# Patient Record
Sex: FEMALE | Race: WHITE | NOT HISPANIC OR LATINO
[De-identification: names, ages, dates, MRNs, and addresses within clinical notes are randomized per-mention and may not be internally consistent; named-entity substitution may affect disease eponyms.]

---

## 2017-03-18 ENCOUNTER — TRANSCRIPTION ENCOUNTER (OUTPATIENT)
Age: 27
End: 2017-03-18

## 2018-07-02 ENCOUNTER — RESULT REVIEW (OUTPATIENT)
Age: 28
End: 2018-07-02

## 2019-10-02 ENCOUNTER — RESULT REVIEW (OUTPATIENT)
Age: 29
End: 2019-10-02

## 2019-12-09 ENCOUNTER — TRANSCRIPTION ENCOUNTER (OUTPATIENT)
Age: 29
End: 2019-12-09

## 2020-04-27 ENCOUNTER — APPOINTMENT (OUTPATIENT)
Dept: DISASTER EMERGENCY | Facility: CLINIC | Age: 30
End: 2020-04-27
Payer: MEDICAID

## 2020-04-27 VITALS
OXYGEN SATURATION: 98 % | DIASTOLIC BLOOD PRESSURE: 68 MMHG | TEMPERATURE: 98.4 F | SYSTOLIC BLOOD PRESSURE: 114 MMHG | HEART RATE: 81 BPM | RESPIRATION RATE: 14 BRPM

## 2020-04-27 DIAGNOSIS — R68.89 OTHER GENERAL SYMPTOMS AND SIGNS: ICD-10-CM

## 2020-04-27 DIAGNOSIS — Z20.828 CONTACT WITH AND (SUSPECTED) EXPOSURE TO OTHER VIRAL COMMUNICABLE DISEASES: ICD-10-CM

## 2020-04-27 LAB — SARS-COV-2 N GENE NPH QL NAA+PROBE: NOT DETECTED

## 2020-04-27 PROCEDURE — 99213 OFFICE O/P EST LOW 20 MIN: CPT

## 2020-04-27 NOTE — HISTORY OF PRESENT ILLNESS
[Patient presents to the office today for COVID-19 evaluation and testing.] : Patient presents to the office today for COVID-19 evaluation and testing. [Patient has been pre-screened by RN at call center for appointment today with our facility.] : Patient has been pre-screened by RN at call center for appointment today with our facility. 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 Patient told to seek emergent care if symptoms occur. [TextBox_[id number]] : asthma

## 2020-05-15 ENCOUNTER — TRANSCRIPTION ENCOUNTER (OUTPATIENT)
Age: 30
End: 2020-05-15

## 2020-10-16 ENCOUNTER — APPOINTMENT (OUTPATIENT)
Dept: OTOLARYNGOLOGY | Facility: CLINIC | Age: 30
End: 2020-10-16
Payer: MEDICAID

## 2020-10-16 VITALS
DIASTOLIC BLOOD PRESSURE: 78 MMHG | HEIGHT: 66 IN | SYSTOLIC BLOOD PRESSURE: 119 MMHG | HEART RATE: 89 BPM | BODY MASS INDEX: 32.95 KG/M2 | WEIGHT: 205 LBS | TEMPERATURE: 98.5 F

## 2020-10-16 PROCEDURE — 99204 OFFICE O/P NEW MOD 45 MIN: CPT | Mod: 25

## 2020-10-16 PROCEDURE — 31231 NASAL ENDOSCOPY DX: CPT

## 2020-10-16 NOTE — PHYSICAL EXAM
[Nasal Endoscopy Performed] : nasal endoscopy was performed, see procedure section for findings [] : septum deviated bilaterally [de-identified] : huge b/l [Midline] : trachea located in midline position [Normal] : orientation to person, place, and time: normal [de-identified] : mildly hyponasal

## 2020-10-16 NOTE — HISTORY OF PRESENT ILLNESS
[de-identified] : saw OSH ENT, given nasal spray for NSD, chronci nasal congestion\par +PND, HA, was using fluticasone, mild effect\par occasional epistaxis, many abx for ARS over the years, >1-2x per year for many years\par no recent CT sinuses\par some decreased smell\par occ otalgia, not often, no change in hearing\par no allergy testing, possible environmental all\par

## 2020-10-30 ENCOUNTER — TRANSCRIPTION ENCOUNTER (OUTPATIENT)
Age: 30
End: 2020-10-30

## 2020-11-30 ENCOUNTER — APPOINTMENT (OUTPATIENT)
Dept: CT IMAGING | Facility: CLINIC | Age: 30
End: 2020-11-30
Payer: MEDICAID

## 2020-11-30 ENCOUNTER — OUTPATIENT (OUTPATIENT)
Dept: OUTPATIENT SERVICES | Facility: HOSPITAL | Age: 30
LOS: 1 days | End: 2020-11-30
Payer: MEDICAID

## 2020-11-30 DIAGNOSIS — Z00.8 ENCOUNTER FOR OTHER GENERAL EXAMINATION: ICD-10-CM

## 2020-11-30 PROCEDURE — 70486 CT MAXILLOFACIAL W/O DYE: CPT

## 2020-11-30 PROCEDURE — 70486 CT MAXILLOFACIAL W/O DYE: CPT | Mod: 26

## 2021-01-15 ENCOUNTER — APPOINTMENT (OUTPATIENT)
Dept: OTOLARYNGOLOGY | Facility: CLINIC | Age: 31
End: 2021-01-15
Payer: MEDICAID

## 2021-01-15 VITALS
HEIGHT: 66 IN | HEART RATE: 81 BPM | BODY MASS INDEX: 35.36 KG/M2 | DIASTOLIC BLOOD PRESSURE: 73 MMHG | WEIGHT: 220 LBS | TEMPERATURE: 97.3 F | SYSTOLIC BLOOD PRESSURE: 112 MMHG

## 2021-01-15 PROCEDURE — 99214 OFFICE O/P EST MOD 30 MIN: CPT | Mod: 25

## 2021-01-15 PROCEDURE — 31231 NASAL ENDOSCOPY DX: CPT

## 2021-01-15 PROCEDURE — 99072 ADDL SUPL MATRL&STAF TM PHE: CPT

## 2021-01-15 NOTE — HISTORY OF PRESENT ILLNESS
[de-identified] : did not tolerate astelin well, given abx for sore throat, helped PND but ST \par no improvement in nasal obstruction\par CT scan reviewed with patient and Mom\par

## 2021-01-15 NOTE — PHYSICAL EXAM
[Nasal Endoscopy Performed] : nasal endoscopy was performed, see procedure section for findings [] : septum deviated bilaterally [de-identified] : huge b/l [Midline] : trachea located in midline position [Normal] : no rashes [de-identified] : mildly hyponasal

## 2021-01-26 ENCOUNTER — APPOINTMENT (OUTPATIENT)
Dept: PEDIATRICS | Facility: CLINIC | Age: 31
End: 2021-01-26
Payer: MEDICAID

## 2021-01-26 VITALS — HEART RATE: 78 BPM | RESPIRATION RATE: 16 BRPM | TEMPERATURE: 98.4 F

## 2021-01-26 DIAGNOSIS — J32.2 CHRONIC ETHMOIDAL SINUSITIS: ICD-10-CM

## 2021-01-26 DIAGNOSIS — J02.0 STREPTOCOCCAL PHARYNGITIS: ICD-10-CM

## 2021-01-26 PROCEDURE — 99213 OFFICE O/P EST LOW 20 MIN: CPT | Mod: 25

## 2021-01-26 PROCEDURE — 99072 ADDL SUPL MATRL&STAF TM PHE: CPT

## 2021-01-26 PROCEDURE — 87880 STREP A ASSAY W/OPTIC: CPT | Mod: QW

## 2021-01-26 RX ORDER — AMOXICILLIN 500 MG/1
500 TABLET, FILM COATED ORAL
Qty: 20 | Refills: 0 | Status: COMPLETED | COMMUNITY
Start: 2021-01-26 | End: 2021-02-05

## 2021-01-26 NOTE — HISTORY OF PRESENT ILLNESS
[___ Day(s)] : [unfilled] day(s) [Constant] : constant [de-identified] : COVID TEST. PT COMPLAINS OF SORE THROAT, congestion and no taste FOR 1 DAY. NO FEVER

## 2021-01-26 NOTE — PHYSICAL EXAM
[No Acute Distress] : no acute distress [Alert] : alert [EOMI] : EOMI [Clear TM bilaterally] : clear tympanic membranes bilaterally [Pink Nasal Mucosa] : pink nasal mucosa [Erythematous Oropharynx] : erythematous oropharynx [Supple] : supple [FROM] : full passive range of motion [Tender cervical lymph nodes] : tender cervical lymph nodes  [Clear to Auscultation Bilaterally] : clear to auscultation bilaterally [Regular Rate and Rhythm] : regular rate and rhythm [Normal S1, S2 audible] : normal S1, S2 audible [No Murmurs] : no murmurs [No Abnormal Lymph Nodes Palpated] : no abnormal lymph nodes palpated [NL] : warm

## 2021-01-26 NOTE — DISCUSSION/SUMMARY
[FreeTextEntry1] : 31 yr old with strep throat\par covid swab to lab\par abx as prescribed\par supp care\par increase fluids\par r/c in 2 wks\par return if s/s persisit or worsen

## 2021-01-30 LAB — SARS-COV-2 N GENE NPH QL NAA+PROBE: NOT DETECTED

## 2021-02-24 ENCOUNTER — TRANSCRIPTION ENCOUNTER (OUTPATIENT)
Age: 31
End: 2021-02-24

## 2021-03-05 DIAGNOSIS — Z01.818 ENCOUNTER FOR OTHER PREPROCEDURAL EXAMINATION: ICD-10-CM

## 2021-03-09 ENCOUNTER — OUTPATIENT (OUTPATIENT)
Dept: OUTPATIENT SERVICES | Facility: HOSPITAL | Age: 31
LOS: 1 days | End: 2021-03-09
Payer: MEDICAID

## 2021-03-09 ENCOUNTER — APPOINTMENT (OUTPATIENT)
Dept: DISASTER EMERGENCY | Facility: CLINIC | Age: 31
End: 2021-03-09

## 2021-03-09 VITALS
SYSTOLIC BLOOD PRESSURE: 127 MMHG | HEIGHT: 66 IN | RESPIRATION RATE: 18 BRPM | HEART RATE: 84 BPM | TEMPERATURE: 98 F | WEIGHT: 229.28 LBS | DIASTOLIC BLOOD PRESSURE: 75 MMHG | OXYGEN SATURATION: 99 %

## 2021-03-09 DIAGNOSIS — J34.89 OTHER SPECIFIED DISORDERS OF NOSE AND NASAL SINUSES: ICD-10-CM

## 2021-03-09 DIAGNOSIS — Z01.818 ENCOUNTER FOR OTHER PREPROCEDURAL EXAMINATION: ICD-10-CM

## 2021-03-09 DIAGNOSIS — J34.2 DEVIATED NASAL SEPTUM: ICD-10-CM

## 2021-03-09 DIAGNOSIS — J34.3 HYPERTROPHY OF NASAL TURBINATES: ICD-10-CM

## 2021-03-09 LAB
HCG SERPL-ACNC: <1 MIU/ML — SIGNIFICANT CHANGE UP
HCT VFR BLD CALC: 44.8 % — SIGNIFICANT CHANGE UP (ref 34.5–45)
HGB BLD-MCNC: 15 G/DL — SIGNIFICANT CHANGE UP (ref 11.5–15.5)
MCHC RBC-ENTMCNC: 29.6 PG — SIGNIFICANT CHANGE UP (ref 27–34)
MCHC RBC-ENTMCNC: 33.5 GM/DL — SIGNIFICANT CHANGE UP (ref 32–36)
MCV RBC AUTO: 88.5 FL — SIGNIFICANT CHANGE UP (ref 80–100)
NRBC # BLD: 0 /100 WBCS — SIGNIFICANT CHANGE UP (ref 0–0)
PLATELET # BLD AUTO: 258 K/UL — SIGNIFICANT CHANGE UP (ref 150–400)
RBC # BLD: 5.06 M/UL — SIGNIFICANT CHANGE UP (ref 3.8–5.2)
RBC # FLD: 12.6 % — SIGNIFICANT CHANGE UP (ref 10.3–14.5)
SARS-COV-2 RNA SPEC QL NAA+PROBE: SIGNIFICANT CHANGE UP
WBC # BLD: 6.96 K/UL — SIGNIFICANT CHANGE UP (ref 3.8–10.5)
WBC # FLD AUTO: 6.96 K/UL — SIGNIFICANT CHANGE UP (ref 3.8–10.5)

## 2021-03-09 PROCEDURE — 85027 COMPLETE CBC AUTOMATED: CPT

## 2021-03-09 PROCEDURE — 84702 CHORIONIC GONADOTROPIN TEST: CPT

## 2021-03-09 PROCEDURE — U0005: CPT

## 2021-03-09 PROCEDURE — 36415 COLL VENOUS BLD VENIPUNCTURE: CPT

## 2021-03-09 PROCEDURE — G0463: CPT

## 2021-03-09 PROCEDURE — U0003: CPT

## 2021-03-09 NOTE — H&P PST ADULT - HISTORY OF PRESENT ILLNESS
30yo female with medical h/o Deviated nasal septum with chronic sinus symptoms and recurrent sinus infections. Pt presents today for PST for PST/Covidpcr test for scheduled Septoplasty, Bilateral Turbinate Resection on 3/12/2021

## 2021-03-09 NOTE — H&P PST ADULT - NSICDXFAMILYHX_GEN_ALL_CORE_FT
FAMILY HISTORY:  Family history of prostate cancer in father  FH: COPD (chronic obstructive pulmonary disease), father - alive  FH: hypertension, mother  FH: type 2 diabetes mellitus, mother - alive

## 2021-03-09 NOTE — H&P PST ADULT - NSICDXPROBLEM_GEN_ALL_CORE_FT
PROBLEM DIAGNOSES  Problem: Deviated nasal septum  Assessment and Plan: Pre-op instructions given. Pt verbalized understanding  Pending: Covidpcr results

## 2021-03-09 NOTE — H&P PST ADULT - NSANTHOSAYNRD_GEN_A_CORE
neck 15.5inches/No. WILL screening performed.  STOP BANG Legend: 0-2 = LOW Risk; 3-4 = INTERMEDIATE Risk; 5-8 = HIGH Risk

## 2021-03-11 ENCOUNTER — TRANSCRIPTION ENCOUNTER (OUTPATIENT)
Age: 31
End: 2021-03-11

## 2021-03-12 ENCOUNTER — OUTPATIENT (OUTPATIENT)
Dept: OUTPATIENT SERVICES | Facility: HOSPITAL | Age: 31
LOS: 1 days | End: 2021-03-12
Payer: MEDICAID

## 2021-03-12 ENCOUNTER — APPOINTMENT (OUTPATIENT)
Dept: OTOLARYNGOLOGY | Facility: HOSPITAL | Age: 31
End: 2021-03-12

## 2021-03-12 ENCOUNTER — RESULT REVIEW (OUTPATIENT)
Age: 31
End: 2021-03-12

## 2021-03-12 VITALS
OXYGEN SATURATION: 96 % | TEMPERATURE: 98 F | DIASTOLIC BLOOD PRESSURE: 72 MMHG | HEART RATE: 88 BPM | SYSTOLIC BLOOD PRESSURE: 124 MMHG | RESPIRATION RATE: 16 BRPM

## 2021-03-12 VITALS
HEART RATE: 72 BPM | RESPIRATION RATE: 18 BRPM | HEIGHT: 66 IN | TEMPERATURE: 98 F | SYSTOLIC BLOOD PRESSURE: 115 MMHG | WEIGHT: 229.28 LBS | OXYGEN SATURATION: 98 % | DIASTOLIC BLOOD PRESSURE: 77 MMHG

## 2021-03-12 DIAGNOSIS — J34.2 DEVIATED NASAL SEPTUM: ICD-10-CM

## 2021-03-12 DIAGNOSIS — J34.3 HYPERTROPHY OF NASAL TURBINATES: ICD-10-CM

## 2021-03-12 DIAGNOSIS — J34.89 OTHER SPECIFIED DISORDERS OF NOSE AND NASAL SINUSES: ICD-10-CM

## 2021-03-12 PROCEDURE — 88304 TISSUE EXAM BY PATHOLOGIST: CPT | Mod: 26

## 2021-03-12 PROCEDURE — 30520 REPAIR OF NASAL SEPTUM: CPT

## 2021-03-12 PROCEDURE — 31231 NASAL ENDOSCOPY DX: CPT

## 2021-03-12 PROCEDURE — 88304 TISSUE EXAM BY PATHOLOGIST: CPT

## 2021-03-12 PROCEDURE — 30140 RESECT INFERIOR TURBINATE: CPT | Mod: 50

## 2021-03-12 PROCEDURE — 88311 DECALCIFY TISSUE: CPT | Mod: 26

## 2021-03-12 PROCEDURE — 88311 DECALCIFY TISSUE: CPT

## 2021-03-12 RX ORDER — HYDROMORPHONE HYDROCHLORIDE 2 MG/ML
0.5 INJECTION INTRAMUSCULAR; INTRAVENOUS; SUBCUTANEOUS
Refills: 0 | Status: DISCONTINUED | OUTPATIENT
Start: 2021-03-12 | End: 2021-03-12

## 2021-03-12 RX ORDER — FLUTICASONE PROPIONATE 50 MCG
50 SPRAY, SUSPENSION NASAL
Qty: 0 | Refills: 0 | DISCHARGE

## 2021-03-12 RX ORDER — OXYCODONE HYDROCHLORIDE 5 MG/1
1 TABLET ORAL
Qty: 8 | Refills: 0
Start: 2021-03-12 | End: 2021-03-13

## 2021-03-12 RX ORDER — SODIUM CHLORIDE 9 MG/ML
1000 INJECTION, SOLUTION INTRAVENOUS
Refills: 0 | Status: DISCONTINUED | OUTPATIENT
Start: 2021-03-12 | End: 2021-03-12

## 2021-03-12 RX ORDER — ONDANSETRON 8 MG/1
4 TABLET, FILM COATED ORAL EVERY 6 HOURS
Refills: 0 | Status: DISCONTINUED | OUTPATIENT
Start: 2021-03-12 | End: 2021-03-26

## 2021-03-12 RX ORDER — CEPHALEXIN 500 MG
1 CAPSULE ORAL
Qty: 30 | Refills: 0
Start: 2021-03-12 | End: 2021-03-21

## 2021-03-12 RX ORDER — OXYCODONE HYDROCHLORIDE 5 MG/1
5 TABLET ORAL EVERY 6 HOURS
Refills: 0 | Status: DISCONTINUED | OUTPATIENT
Start: 2021-03-12 | End: 2021-03-12

## 2021-03-12 RX ORDER — NORETHINDRONE AND ETHINYL ESTRADIOL 0.4-0.035
1 KIT ORAL
Qty: 0 | Refills: 0 | DISCHARGE

## 2021-03-12 RX ORDER — HYDROMORPHONE HYDROCHLORIDE 2 MG/ML
1 INJECTION INTRAMUSCULAR; INTRAVENOUS; SUBCUTANEOUS
Refills: 0 | Status: DISCONTINUED | OUTPATIENT
Start: 2021-03-12 | End: 2021-03-12

## 2021-03-12 RX ORDER — ONDANSETRON 8 MG/1
4 TABLET, FILM COATED ORAL ONCE
Refills: 0 | Status: DISCONTINUED | OUTPATIENT
Start: 2021-03-12 | End: 2021-03-12

## 2021-03-12 RX ADMIN — SODIUM CHLORIDE 50 MILLILITER(S): 9 INJECTION, SOLUTION INTRAVENOUS at 06:32

## 2021-03-12 NOTE — BRIEF OPERATIVE NOTE - NSICDXBRIEFPROCEDURE_GEN_ALL_CORE_FT
PROCEDURES:  Nasal septoplasty 12-Mar-2021 10:37:16 b/l inferior turbinate resection, nasal endoscopy Kim Laird

## 2021-03-12 NOTE — ASU DISCHARGE PLAN (ADULT/PEDIATRIC) - CARE PROVIDER_API CALL
Thom Zamarripa; PhD)  Otolaryngology  [facility] - Dept of Otolaryngology, [address] [place] [address]  Phone: [phone]  Fax: [phone]  Follow Up Time:

## 2021-03-18 ENCOUNTER — APPOINTMENT (OUTPATIENT)
Dept: OTOLARYNGOLOGY | Facility: CLINIC | Age: 31
End: 2021-03-18
Payer: MEDICAID

## 2021-03-18 DIAGNOSIS — J34.89 OTHER SPECIFIED DISORDERS OF NOSE AND NASAL SINUSES: ICD-10-CM

## 2021-03-18 PROBLEM — J45.909 UNSPECIFIED ASTHMA, UNCOMPLICATED: Chronic | Status: ACTIVE | Noted: 2021-03-12

## 2021-03-18 PROBLEM — J34.2 DEVIATED NASAL SEPTUM: Chronic | Status: ACTIVE | Noted: 2021-03-09

## 2021-03-18 PROCEDURE — 99024 POSTOP FOLLOW-UP VISIT: CPT

## 2021-04-10 NOTE — HISTORY OF PRESENT ILLNESS
[de-identified] : 30yo F here for 1 week post op splints removal. Nervous to get splints removed. Mild bleeding this AM. Stopped on its own. Took pain meds for 3 days only before bed. Few more days of abx. Anxious about splint removal, comes with Mom today.\par

## 2021-04-13 NOTE — ASU PREOP CHECKLIST - HEIGHT IN INCHES
Patient called and left message to call back   CAROL Nascimento
Patient called back and ct lung results discussed  Will repeat in a year to follow up on lung nodules as advised by radiologist  Discussed about mild emphysema and denies any sob and advised to avoid smoking to prevent further damage  Will call for any concerns   CAROL Gonzales
6

## 2021-05-07 ENCOUNTER — APPOINTMENT (OUTPATIENT)
Dept: OTOLARYNGOLOGY | Facility: CLINIC | Age: 31
End: 2021-05-07
Payer: MEDICAID

## 2021-05-07 VITALS
WEIGHT: 235 LBS | TEMPERATURE: 96.4 F | DIASTOLIC BLOOD PRESSURE: 74 MMHG | HEIGHT: 66 IN | SYSTOLIC BLOOD PRESSURE: 108 MMHG | HEART RATE: 87 BPM | BODY MASS INDEX: 37.77 KG/M2

## 2021-05-07 PROCEDURE — 31231 NASAL ENDOSCOPY DX: CPT | Mod: 58

## 2021-05-07 PROCEDURE — 99024 POSTOP FOLLOW-UP VISIT: CPT

## 2021-05-07 NOTE — HISTORY OF PRESENT ILLNESS
[de-identified] : 30yo F here for f/u nasal obstruction. No bleeding this AM. C/o continued drainage with green/creamy mucus from right side. No reecnt abx. Pain resolved. \par

## 2021-05-07 NOTE — ASU DISCHARGE PLAN (ADULT/PEDIATRIC) - ASU DC SPECIAL INSTRUCTIONSFT
How Severe Is Your Skin Lesion?: mild Have Your Skin Lesions Been Treated?: not been treated Is This A New Presentation, Or A Follow-Up?: Skin Lesions TAKE ANTIBIOTIC DAILY    TAKE OXYCODONE FOR SEVERE PAIN, OTHERWISE YOU MAY TAKE TYLENOL OR MOTRIN FOR MILD/MODERATE PAIN    DO NOT DRIVE WHILE TAKING OXYCODONE    YOU HAVE PLASTIC SPLINTS SUTURED TO YOUR NASAL SEPTUM, THOSE WILL BE TAKEN OUT IN THE OFFICE    DO NOT BLOW YOUR NOSE    IF YOU NEED TO CHANGE THE DRESSING UNDER YOUR NOSE MORE THAN TWICE AN HOUR (IF IT IS SOAKED WITH BRIGHT RED BLOOD) PROCEED TO THE EMERGENCY DEPARTMENT     NO HEAVY LIFTING, BENDING OR STRAINING    FOLLOW UP WITH DR. STEWART NEXT THURSDAY 3/18/21, PLEASE CALL THE OFFICE FOR AN APPOINTMENT

## 2021-05-25 ENCOUNTER — TRANSCRIPTION ENCOUNTER (OUTPATIENT)
Age: 31
End: 2021-05-25

## 2021-06-07 ENCOUNTER — TRANSCRIPTION ENCOUNTER (OUTPATIENT)
Age: 31
End: 2021-06-07

## 2021-06-08 ENCOUNTER — RX RENEWAL (OUTPATIENT)
Age: 31
End: 2021-06-08

## 2021-07-06 ENCOUNTER — RX RENEWAL (OUTPATIENT)
Age: 31
End: 2021-07-06

## 2021-10-26 ENCOUNTER — APPOINTMENT (OUTPATIENT)
Dept: PEDIATRICS | Facility: CLINIC | Age: 31
End: 2021-10-26

## 2021-10-30 ENCOUNTER — TRANSCRIPTION ENCOUNTER (OUTPATIENT)
Age: 31
End: 2021-10-30

## 2021-11-13 ENCOUNTER — TRANSCRIPTION ENCOUNTER (OUTPATIENT)
Age: 31
End: 2021-11-13

## 2022-02-27 ENCOUNTER — TRANSCRIPTION ENCOUNTER (OUTPATIENT)
Age: 32
End: 2022-02-27

## 2022-04-04 ENCOUNTER — TRANSCRIPTION ENCOUNTER (OUTPATIENT)
Age: 32
End: 2022-04-04

## 2022-05-10 ENCOUNTER — APPOINTMENT (OUTPATIENT)
Dept: OTOLARYNGOLOGY | Facility: CLINIC | Age: 32
End: 2022-05-10
Payer: COMMERCIAL

## 2022-05-10 VITALS
BODY MASS INDEX: 40.18 KG/M2 | SYSTOLIC BLOOD PRESSURE: 107 MMHG | HEART RATE: 81 BPM | DIASTOLIC BLOOD PRESSURE: 74 MMHG | HEIGHT: 66 IN | WEIGHT: 250 LBS

## 2022-05-10 PROCEDURE — 99214 OFFICE O/P EST MOD 30 MIN: CPT | Mod: 25

## 2022-05-10 PROCEDURE — 31231 NASAL ENDOSCOPY DX: CPT

## 2022-05-10 RX ORDER — AMOXICILLIN AND CLAVULANATE POTASSIUM 875; 125 MG/1; MG/1
875-125 TABLET, COATED ORAL
Qty: 20 | Refills: 0 | Status: DISCONTINUED | COMMUNITY
Start: 2021-05-07 | End: 2022-05-10

## 2022-05-10 RX ORDER — AZELASTINE HYDROCHLORIDE 137 UG/1
0.1 SPRAY, METERED NASAL TWICE DAILY
Qty: 1 | Refills: 1 | Status: DISCONTINUED | COMMUNITY
Start: 2020-10-16 | End: 2022-05-10

## 2022-05-10 RX ORDER — FLUTICASONE PROPIONATE 50 UG/1
50 SPRAY, METERED NASAL
Qty: 1 | Refills: 0 | Status: DISCONTINUED | COMMUNITY
Start: 2021-05-07 | End: 2022-05-10

## 2022-05-10 RX ORDER — FLUTICASONE PROPIONATE 50 UG/1
50 SPRAY, METERED NASAL
Qty: 16 | Refills: 0 | Status: DISCONTINUED | COMMUNITY
Start: 2021-06-08 | End: 2022-05-10

## 2022-05-10 NOTE — HISTORY OF PRESENT ILLNESS
[de-identified] : 32 year old woman, annual follow up for chronic nasal congestion\par History of sinus infection, nasal obstruction, s/p septoplasty 14 months ago\par No longer using Flonase, Using Nedi pot 3x/week\par Reports doing well since last visit, but since the winter season, symptoms exacerbated when experiencing common cold-like symptoms and having COVID-19 early Jan 2022, completed course of oral antibiotic 1 month ago\par Currently reports bilateral sinus pain with chronic post nasal drip, intermittent headaches and poor sense of smell\par Denies nasal congestion, difficulty breathing, rhinorrhea, frontal/orbital pain or pressure, epistaxis, recent fevers and sinus infections

## 2022-05-10 NOTE — CONSULT LETTER
[Dear  ___] : Dear  [unfilled], [Sincerely,] : Sincerely, [FreeTextEntry2] : Carin Broderick MD ([place], [place]) [FreeTextEntry3] : Thom Zamarripa MD, PhD\par Chief, Division of Laryngology\par Department of Otolaryngology\par [facility]\par Pediatric Otolaryngology, [facility]\par  of Otolaryngology\par [facility]

## 2022-11-08 ENCOUNTER — NON-APPOINTMENT (OUTPATIENT)
Age: 32
End: 2022-11-08

## 2022-12-29 ENCOUNTER — NON-APPOINTMENT (OUTPATIENT)
Age: 32
End: 2022-12-29

## 2023-01-23 ENCOUNTER — NON-APPOINTMENT (OUTPATIENT)
Age: 33
End: 2023-01-23

## 2023-02-15 ENCOUNTER — APPOINTMENT (OUTPATIENT)
Dept: OTOLARYNGOLOGY | Facility: CLINIC | Age: 33
End: 2023-02-15
Payer: COMMERCIAL

## 2023-02-15 VITALS
BODY MASS INDEX: 40.18 KG/M2 | SYSTOLIC BLOOD PRESSURE: 128 MMHG | HEART RATE: 82 BPM | DIASTOLIC BLOOD PRESSURE: 78 MMHG | WEIGHT: 250 LBS | HEIGHT: 66 IN

## 2023-02-15 PROCEDURE — 99214 OFFICE O/P EST MOD 30 MIN: CPT | Mod: 25

## 2023-02-15 PROCEDURE — 31231 NASAL ENDOSCOPY DX: CPT

## 2023-02-15 RX ORDER — METHYLPREDNISOLONE 4 MG/1
4 TABLET ORAL
Qty: 1 | Refills: 1 | Status: COMPLETED | COMMUNITY
Start: 2022-05-10 | End: 2023-02-15

## 2023-02-15 RX ORDER — AMOXICILLIN AND CLAVULANATE POTASSIUM 875; 125 MG/1; MG/1
875-125 TABLET, COATED ORAL
Qty: 14 | Refills: 0 | Status: COMPLETED | COMMUNITY
Start: 2022-05-10 | End: 2023-02-15

## 2023-02-15 RX ORDER — NORETHINDRONE ACETATE AND ETHINYL ESTRADIOL 1; 20 MG/1; UG/1
TABLET ORAL
Refills: 0 | Status: ACTIVE | COMMUNITY

## 2023-02-15 NOTE — CONSULT LETTER
[Dear  ___] : Dear  [unfilled], [Sincerely,] : Sincerely, [FreeTextEntry2] : Carin Broderick MD ([place], [place]) [FreeTextEntry3] : Thom Zamarripa MD, PhD\par Chief, Division of Laryngology\par Department of Otolaryngology\par [facility]\par Pediatric Otolaryngology, [facility]\par  of Otolaryngology\par [facility]

## 2023-02-15 NOTE — HISTORY OF PRESENT ILLNESS
[de-identified] : 32 year old woman, annual follow up for chronic nasal congestion\par History of sinus infection, nasal obstruction, s/p septoplasty 23 months ago\par (+) sinus infection 01/2023 with tooth pain- went to dentist and needed tooth #3 and #15 to be removed- prescribed amoxicillin and now on PO clindamycin \par Reports some nasal congestion \par Occasionally using Flonase, and Neti pot PRN\par States breathing is good. \par Currently reports bilateral sinus pain with chronic post nasal drip, intermittent headaches and poor sense of smell\par Denies difficulty breathing, rhinorrhea, dysphagia, epistaxis,throat pain, otalgia, recent fevers and sinus infections \par Pain is now better but still congested\par

## 2023-03-09 ENCOUNTER — NON-APPOINTMENT (OUTPATIENT)
Age: 33
End: 2023-03-09

## 2023-03-23 ENCOUNTER — NON-APPOINTMENT (OUTPATIENT)
Age: 33
End: 2023-03-23

## 2023-05-02 ENCOUNTER — APPOINTMENT (OUTPATIENT)
Dept: OTOLARYNGOLOGY | Facility: CLINIC | Age: 33
End: 2023-05-02
Payer: SELF-PAY

## 2023-05-02 PROCEDURE — 99214 OFFICE O/P EST MOD 30 MIN: CPT | Mod: 95

## 2023-06-11 NOTE — HISTORY OF PRESENT ILLNESS
[Home] : at home, [unfilled] , at the time of the visit. [Medical Office: ([facility])___] : at the medical office located in  [Verbal consent obtained from patient] : the patient, [unfilled] [de-identified] : 33 year old woman, annual follow up for chronic nasal congestion\par Overall doing better, but was more congested and went to urgent care and was told to use mometasone more often\par No side effects\par No oral steroids needed \par History of sinus infection, nasal obstruction, s/p septoplasty 2 years ago\par Reports improved nasal congestion today \par Occasionally using Flonase, and Neti pot PRN\par States breathing feels good. \par Currently reports bilateral sinus pain with chronic post nasal drip, intermittent headaches and poor sense of smell\par Denies difficulty breathing, rhinorrhea, dysphagia, epistaxis,throat pain, otalgia, recent fevers and sinus infections \par Pain is now better but still congested\par

## 2023-06-15 NOTE — ASU PATIENT PROFILE, ADULT - NS TRANSFER DISPOSITION PATIENT BELONGINGS
[Initial Evaluation] : an initial evaluation [Mother] : mother [FreeTextEntry1] : Left thumb fracture with patient

## 2023-07-07 ENCOUNTER — RX RENEWAL (OUTPATIENT)
Age: 33
End: 2023-07-07

## 2023-07-07 RX ORDER — MOMETASONE 50 UG/1
50 SPRAY, METERED NASAL
Qty: 1 | Refills: 1 | Status: ACTIVE | COMMUNITY
Start: 2023-02-15 | End: 1900-01-01

## 2023-07-19 ENCOUNTER — NON-APPOINTMENT (OUTPATIENT)
Age: 33
End: 2023-07-19

## 2023-07-21 ENCOUNTER — EMERGENCY (EMERGENCY)
Facility: HOSPITAL | Age: 33
LOS: 1 days | Discharge: HOME | End: 2023-07-21
Attending: EMERGENCY MEDICINE
Payer: COMMERCIAL

## 2023-07-21 VITALS
HEART RATE: 98 BPM | SYSTOLIC BLOOD PRESSURE: 130 MMHG | DIASTOLIC BLOOD PRESSURE: 87 MMHG | WEIGHT: 255.07 LBS | OXYGEN SATURATION: 98 % | HEIGHT: 66 IN | TEMPERATURE: 98 F | RESPIRATION RATE: 18 BRPM

## 2023-07-21 VITALS
DIASTOLIC BLOOD PRESSURE: 81 MMHG | RESPIRATION RATE: 18 BRPM | SYSTOLIC BLOOD PRESSURE: 116 MMHG | TEMPERATURE: 98 F | OXYGEN SATURATION: 96 % | HEART RATE: 89 BPM

## 2023-07-21 LAB
ALBUMIN SERPL ELPH-MCNC: 3.8 G/DL — SIGNIFICANT CHANGE UP (ref 3.3–5)
ALP SERPL-CCNC: 68 U/L — SIGNIFICANT CHANGE UP (ref 40–120)
ALT FLD-CCNC: 12 U/L — SIGNIFICANT CHANGE UP (ref 10–45)
ANION GAP SERPL CALC-SCNC: 13 MMOL/L — SIGNIFICANT CHANGE UP (ref 5–17)
AST SERPL-CCNC: 25 U/L — SIGNIFICANT CHANGE UP (ref 10–40)
BASOPHILS # BLD AUTO: 0.05 K/UL — SIGNIFICANT CHANGE UP (ref 0–0.2)
BASOPHILS NFR BLD AUTO: 0.6 % — SIGNIFICANT CHANGE UP (ref 0–2)
BILIRUB SERPL-MCNC: 0.3 MG/DL — SIGNIFICANT CHANGE UP (ref 0.2–1.2)
BUN SERPL-MCNC: 9 MG/DL — SIGNIFICANT CHANGE UP (ref 7–23)
CALCIUM SERPL-MCNC: 9 MG/DL — SIGNIFICANT CHANGE UP (ref 8.4–10.5)
CHLORIDE SERPL-SCNC: 106 MMOL/L — SIGNIFICANT CHANGE UP (ref 96–108)
CO2 SERPL-SCNC: 18 MMOL/L — LOW (ref 22–31)
CREAT SERPL-MCNC: 0.74 MG/DL — SIGNIFICANT CHANGE UP (ref 0.5–1.3)
EGFR: 110 ML/MIN/1.73M2 — SIGNIFICANT CHANGE UP
EOSINOPHIL # BLD AUTO: 0.74 K/UL — HIGH (ref 0–0.5)
EOSINOPHIL NFR BLD AUTO: 8.7 % — HIGH (ref 0–6)
GLUCOSE SERPL-MCNC: 98 MG/DL — SIGNIFICANT CHANGE UP (ref 70–99)
HCG SERPL-ACNC: <2 MIU/ML — SIGNIFICANT CHANGE UP
HCT VFR BLD CALC: 40.8 % — SIGNIFICANT CHANGE UP (ref 34.5–45)
HGB BLD-MCNC: 13.4 G/DL — SIGNIFICANT CHANGE UP (ref 11.5–15.5)
IMM GRANULOCYTES NFR BLD AUTO: 0.5 % — SIGNIFICANT CHANGE UP (ref 0–0.9)
LIDOCAIN IGE QN: 24 U/L — SIGNIFICANT CHANGE UP (ref 7–60)
LYMPHOCYTES # BLD AUTO: 2.3 K/UL — SIGNIFICANT CHANGE UP (ref 1–3.3)
LYMPHOCYTES # BLD AUTO: 27 % — SIGNIFICANT CHANGE UP (ref 13–44)
MAGNESIUM SERPL-MCNC: 1.9 MG/DL — SIGNIFICANT CHANGE UP (ref 1.6–2.6)
MCHC RBC-ENTMCNC: 28.6 PG — SIGNIFICANT CHANGE UP (ref 27–34)
MCHC RBC-ENTMCNC: 32.8 GM/DL — SIGNIFICANT CHANGE UP (ref 32–36)
MCV RBC AUTO: 87.2 FL — SIGNIFICANT CHANGE UP (ref 80–100)
MONOCYTES # BLD AUTO: 0.78 K/UL — SIGNIFICANT CHANGE UP (ref 0–0.9)
MONOCYTES NFR BLD AUTO: 9.2 % — SIGNIFICANT CHANGE UP (ref 2–14)
NEUTROPHILS # BLD AUTO: 4.6 K/UL — SIGNIFICANT CHANGE UP (ref 1.8–7.4)
NEUTROPHILS NFR BLD AUTO: 54 % — SIGNIFICANT CHANGE UP (ref 43–77)
NRBC # BLD: 0 /100 WBCS — SIGNIFICANT CHANGE UP (ref 0–0)
PLATELET # BLD AUTO: 239 K/UL — SIGNIFICANT CHANGE UP (ref 150–400)
POTASSIUM SERPL-MCNC: 4.3 MMOL/L — SIGNIFICANT CHANGE UP (ref 3.5–5.3)
POTASSIUM SERPL-SCNC: 4.3 MMOL/L — SIGNIFICANT CHANGE UP (ref 3.5–5.3)
PROT SERPL-MCNC: 7.6 G/DL — SIGNIFICANT CHANGE UP (ref 6–8.3)
RBC # BLD: 4.68 M/UL — SIGNIFICANT CHANGE UP (ref 3.8–5.2)
RBC # FLD: 12.7 % — SIGNIFICANT CHANGE UP (ref 10.3–14.5)
SODIUM SERPL-SCNC: 137 MMOL/L — SIGNIFICANT CHANGE UP (ref 135–145)
WBC # BLD: 8.51 K/UL — SIGNIFICANT CHANGE UP (ref 3.8–10.5)
WBC # FLD AUTO: 8.51 K/UL — SIGNIFICANT CHANGE UP (ref 3.8–10.5)

## 2023-07-21 PROCEDURE — 99285 EMERGENCY DEPT VISIT HI MDM: CPT

## 2023-07-21 PROCEDURE — 80053 COMPREHEN METABOLIC PANEL: CPT

## 2023-07-21 PROCEDURE — 84702 CHORIONIC GONADOTROPIN TEST: CPT

## 2023-07-21 PROCEDURE — 74177 CT ABD & PELVIS W/CONTRAST: CPT | Mod: 26,MA

## 2023-07-21 PROCEDURE — 74177 CT ABD & PELVIS W/CONTRAST: CPT | Mod: MA

## 2023-07-21 PROCEDURE — 96374 THER/PROPH/DIAG INJ IV PUSH: CPT | Mod: XU

## 2023-07-21 PROCEDURE — 83690 ASSAY OF LIPASE: CPT

## 2023-07-21 PROCEDURE — 83735 ASSAY OF MAGNESIUM: CPT

## 2023-07-21 PROCEDURE — 85025 COMPLETE CBC W/AUTO DIFF WBC: CPT

## 2023-07-21 PROCEDURE — 99284 EMERGENCY DEPT VISIT MOD MDM: CPT | Mod: 25

## 2023-07-21 RX ORDER — FAMOTIDINE 10 MG/ML
20 INJECTION INTRAVENOUS ONCE
Refills: 0 | Status: COMPLETED | OUTPATIENT
Start: 2023-07-21 | End: 2023-07-21

## 2023-07-21 RX ADMIN — FAMOTIDINE 20 MILLIGRAM(S): 10 INJECTION INTRAVENOUS at 10:49

## 2023-07-21 NOTE — ED ADULT NURSE NOTE - OBJECTIVE STATEMENT
33 y.o F BIB self p/w c/o abdominal pain. A+Ox4. Pt states intermittent abd pain x2 wks ranging from dull to sharp pain. States at rest pain 3/10 and when onset of pain occur 10/10 on pain scale. Denies any nausea/ vomiting. Reports pain improves w/ food. LBM Tuesday, took laxative Monday. LMP 6/29. Denies being sexually active. Bartolome urinary symptoms. Upon initial assessment, abd soft, non-distended, and tender to touch in B/L upper quadrants. BS heard x4 quadrants. Denies bloody stools/ hematuria, fever/ chills. Mother at bedside, side rails up, call bell in reach, bed in lowest position.

## 2023-07-21 NOTE — ED PROVIDER NOTE - NSFOLLOWUPINSTRUCTIONS_ED_ALL_ED_FT
You were seen in the emergency department for abdominal pain.      Your lab and imaging results are attached.    Please follow-up with GI.    Please return to the emergency department for any new or worsening symptoms including but not limited to severe pain not controlled at home, unable to tolerate food or liquid, dark or bloody stools, fevers, pain localizes to one specific area.

## 2023-07-21 NOTE — ED PROVIDER NOTE - PATIENT PORTAL LINK FT
You can access the FollowMyHealth Patient Portal offered by [facility] by registering at the following website: [email or web address]. By joining [facility]’s FollowMyHealth portal, you will also be able to view your health information using other applications (apps) compatible with our system.

## 2023-07-21 NOTE — ED ADULT NURSE NOTE - NSFALLUNIVINTERV_ED_ALL_ED
Bed/Stretcher in lowest position, wheels locked, appropriate side rails in place/Call bell, personal items and telephone in reach/Instruct patient to call for assistance before getting out of bed/chair/stretcher/Non-slip footwear applied when patient is off stretcher/[place] to call system/Physically safe environment - no spills, clutter or unnecessary equipment/Purposeful proactive rounding/Room/bathroom lighting operational, light cord in reach

## 2023-07-21 NOTE — ED PROVIDER NOTE - PHYSICAL EXAMINATION
GEN: NAD. A&Ox3. Non-toxic appearing.  HEENT: normocephalic, atraumatic, EOMI, PERRL, no scleral icterus  CARDIAC: RRR, S1, S2, no murmur. Radial pulses present and symmetric b/l  PULM: CTA B/L no wheeze, rhonchi, rales.   ABD: diffuse upper abd ttp, worse in epigastric region, no rebound or guarding, mild RLQ ttp  MSK: Moving all extremities  NEURO: no focal neurological deficits, CN 2-12 grossly intact  SKIN: warm, dry, no rash.

## 2023-07-21 NOTE — ED PROVIDER NOTE - OBJECTIVE STATEMENT
33-year-old female no past medical history, on OCPs, presents with 2 weeks of intermittent sharp and dull upper abdominal pain, improves with eating. Went to urgent care last night, advised to come to ED.  Denies any fevers, nausea, vomiting, chest pain, shortness of breath, recent travel, urinary symptoms, diarrhea, constipation, dark or bloody stools.   LMP 6/28.  Has an appointment on July 31 with GI.

## 2023-07-21 NOTE — ED PROVIDER NOTE - ATTENDING CONTRIBUTION TO CARE
Attending MD Mendoza:  I personally have seen and examined this patient.  Resident note reviewed and agree on plan of care and except where noted.  Please see my MDM for further details.

## 2023-07-21 NOTE — ED PROVIDER NOTE - CLINICAL SUMMARY MEDICAL DECISION MAKING FREE TEXT BOX
33-year-old female no past medical history, on OCPs, presents with 2 weeks of intermittent sharp and dull upper abdominal pain, improves with eating. Went to urgent care last night, advised to come to ED.  Denies any fevers, nausea, vomiting, chest pain, shortness of breath, recent travel, urinary symptoms, diarrhea, constipation, dark or bloody stools.   LMP 6/28.  Has an appointment on July 31 with GI. Afebrile, not tachycardic, not hypotensive, diffuse upper abdominal tenderness to palpation worse in epigastric region, mild right lower quadrant tenderness to palpation.  Will evaluate for acute intra-abdominal pathology including but not limited to hepatobiliary disease, pancreatitis, appendicitis, colitis.  Will obtain labs, CT scan, symptom control, reassess. 33-year-old female no past medical history, on OCPs, presents with 2 weeks of intermittent sharp and dull upper abdominal pain, improves with eating. Went to urgent care last night, advised to come to ED.  Denies any fevers, nausea, vomiting, chest pain, shortness of breath, recent travel, urinary symptoms, diarrhea, constipation, dark or bloody stools.   LMP 6/28.  Has an appointment on July 31 with GI. Afebrile, not tachycardic, not hypotensive, diffuse upper abdominal tenderness to palpation worse in epigastric region, mild right lower quadrant tenderness to palpation.  Will evaluate for acute intra-abdominal pathology including but not limited to hepatobiliary disease, pancreatitis, appendicitis, colitis.  Will obtain labs, CT scan, symptom control, reassess.    Attending MD Mendoza:   33-year-old female no past medical history, on OCPs, LMP 6/29 presents with 2 weeks of intermittent sharp and dull upper abdominal pain, improves with eating.   Denies any fevers, nausea, vomiting, chest pain, shortness of breath, recent travel, urinary symptoms, diarrhea, dark or bloody stools.  Last BM was 4 days ago.  Hx of constipation. No vaginal or urinary symptoms.  Not sexually active.    Key physical exam findings: Mild upper abd tenderness to palpation no rebound or guarding.  DDX: constipation, gastritis, pancreatitis, PUD  I discussed care with:  Patient's mother at bedside  Plan: labs, UCG, CT abd and pelvis, pepcid  The following testing was considered but no ordered after discussion with patient/family: RUQ ultrasound as pain not specific to RUQ.

## 2023-08-21 ENCOUNTER — NON-APPOINTMENT (OUTPATIENT)
Age: 33
End: 2023-08-21

## 2023-09-01 ENCOUNTER — APPOINTMENT (OUTPATIENT)
Dept: OTOLARYNGOLOGY | Facility: CLINIC | Age: 33
End: 2023-09-01
Payer: COMMERCIAL

## 2023-09-01 VITALS
SYSTOLIC BLOOD PRESSURE: 110 MMHG | BODY MASS INDEX: 40.18 KG/M2 | HEIGHT: 66 IN | HEART RATE: 70 BPM | WEIGHT: 250 LBS | OXYGEN SATURATION: 98 % | DIASTOLIC BLOOD PRESSURE: 75 MMHG

## 2023-09-01 PROCEDURE — 99214 OFFICE O/P EST MOD 30 MIN: CPT | Mod: 25

## 2023-09-01 PROCEDURE — 31231 NASAL ENDOSCOPY DX: CPT

## 2023-09-01 NOTE — CONSULT LETTER
[Dear  ___] : Dear  [unfilled], [Sincerely,] : Sincerely, [FreeTextEntry2] : Carin Broderick MD ([place], [place]) [FreeTextEntry3] : Thom Zamarripa MD, PhD\par  Chief, Division of Laryngology\par  Department of Otolaryngology\par  [facility]\par  Pediatric Otolaryngology, [facility]\par   of Otolaryngology\par  [facility]

## 2023-09-28 ENCOUNTER — APPOINTMENT (OUTPATIENT)
Dept: CT IMAGING | Facility: HOSPITAL | Age: 33
End: 2023-09-28
Payer: COMMERCIAL

## 2023-09-28 ENCOUNTER — OUTPATIENT (OUTPATIENT)
Dept: OUTPATIENT SERVICES | Facility: HOSPITAL | Age: 33
LOS: 1 days | End: 2023-09-28
Payer: COMMERCIAL

## 2023-09-28 DIAGNOSIS — J34.89 OTHER SPECIFIED DISORDERS OF NOSE AND NASAL SINUSES: ICD-10-CM

## 2023-09-28 PROCEDURE — 70486 CT MAXILLOFACIAL W/O DYE: CPT

## 2023-09-28 PROCEDURE — 70486 CT MAXILLOFACIAL W/O DYE: CPT | Mod: 26

## 2023-10-17 ENCOUNTER — APPOINTMENT (OUTPATIENT)
Dept: ULTRASOUND IMAGING | Facility: CLINIC | Age: 33
End: 2023-10-17
Payer: COMMERCIAL

## 2023-10-17 PROCEDURE — 76700 US EXAM ABDOM COMPLETE: CPT

## 2023-11-30 NOTE — HISTORY OF PRESENT ILLNESS
[de-identified] : 33 year old female presents for follow up for chronic nasal congestion. History of septoplasty about 2 years ago.  Congestion appears to be stable since last spoken with in May. Using mometasone rinses and azelastine PRN. Congestion occurs if doesn't use sprays Continues to have poor sense of smell.  Denies sinus pain, post nasal drip, dyspnea, recent fevers or infection.  Headaches better following procedure No bleeding Having stomach pain and GI issues, having egd next week Still getting sinus headaches   10

## 2024-01-03 ENCOUNTER — NON-APPOINTMENT (OUTPATIENT)
Age: 34
End: 2024-01-03

## 2024-01-09 ENCOUNTER — NON-APPOINTMENT (OUTPATIENT)
Age: 34
End: 2024-01-09

## 2024-01-09 ENCOUNTER — LABORATORY RESULT (OUTPATIENT)
Age: 34
End: 2024-01-09

## 2024-01-09 ENCOUNTER — APPOINTMENT (OUTPATIENT)
Dept: PEDIATRIC ALLERGY IMMUNOLOGY | Facility: CLINIC | Age: 34
End: 2024-01-09
Payer: COMMERCIAL

## 2024-01-09 ENCOUNTER — TRANSCRIPTION ENCOUNTER (OUTPATIENT)
Age: 34
End: 2024-01-09

## 2024-01-09 VITALS
DIASTOLIC BLOOD PRESSURE: 79 MMHG | BODY MASS INDEX: 41.68 KG/M2 | HEIGHT: 66 IN | SYSTOLIC BLOOD PRESSURE: 125 MMHG | HEART RATE: 83 BPM | OXYGEN SATURATION: 96 % | WEIGHT: 259.38 LBS

## 2024-01-09 DIAGNOSIS — J30.81 ALLERGIC RHINITIS DUE TO ANIMAL (CAT) (DOG) HAIR AND DANDER: ICD-10-CM

## 2024-01-09 DIAGNOSIS — J30.89 OTHER ALLERGIC RHINITIS: ICD-10-CM

## 2024-01-09 DIAGNOSIS — J32.0 CHRONIC MAXILLARY SINUSITIS: ICD-10-CM

## 2024-01-09 PROCEDURE — 95012 NITRIC OXIDE EXP GAS DETER: CPT

## 2024-01-09 PROCEDURE — 94060 EVALUATION OF WHEEZING: CPT

## 2024-01-09 PROCEDURE — 95004 PERQ TESTS W/ALRGNC XTRCS: CPT

## 2024-01-09 PROCEDURE — 94664 DEMO&/EVAL PT USE INHALER: CPT | Mod: 59

## 2024-01-09 PROCEDURE — 99244 OFF/OP CNSLTJ NEW/EST MOD 40: CPT | Mod: 25

## 2024-01-09 RX ORDER — LEVOCETIRIZINE DIHYDROCHLORIDE 5 MG/1
5 TABLET ORAL
Qty: 30 | Refills: 3 | Status: ACTIVE | COMMUNITY
Start: 2024-01-09 | End: 1900-01-01

## 2024-01-09 RX ORDER — CLINDAMYCIN HYDROCHLORIDE 300 MG/1
CAPSULE ORAL
Refills: 0 | Status: COMPLETED | COMMUNITY
End: 2024-01-09

## 2024-01-09 RX ORDER — IBUPROFEN 200 MG/1
CAPSULE, LIQUID FILLED ORAL
Refills: 0 | Status: COMPLETED | COMMUNITY
End: 2024-01-09

## 2024-01-09 RX ORDER — OMEPRAZOLE 40 MG/1
40 CAPSULE, DELAYED RELEASE ORAL
Refills: 0 | Status: COMPLETED | COMMUNITY
End: 2024-01-09

## 2024-01-09 RX ORDER — FLUTICASONE PROPIONATE 93 UG/1
93 SPRAY, METERED NASAL
Qty: 3 | Refills: 3 | Status: ACTIVE | COMMUNITY
Start: 2024-01-09 | End: 1900-01-01

## 2024-01-10 ENCOUNTER — NON-APPOINTMENT (OUTPATIENT)
Age: 34
End: 2024-01-10

## 2024-01-10 LAB
ALBUMIN SERPL ELPH-MCNC: 4.7 G/DL
ALP BLD-CCNC: 105 U/L
ALT SERPL-CCNC: 15 U/L
ANION GAP SERPL CALC-SCNC: 12 MMOL/L
AST SERPL-CCNC: 20 U/L
BASOPHILS # BLD AUTO: 0.04 K/UL
BASOPHILS NFR BLD AUTO: 0.4 %
BILIRUB SERPL-MCNC: 0.3 MG/DL
BUN SERPL-MCNC: 8 MG/DL
CALCIUM SERPL-MCNC: 9.4 MG/DL
CH50 SERPL-MCNC: >98 U/ML
CHLORIDE SERPL-SCNC: 102 MMOL/L
CO2 SERPL-SCNC: 26 MMOL/L
CREAT SERPL-MCNC: 0.76 MG/DL
DEPRECATED KAPPA LC FREE/LAMBDA SER: 1.22 RATIO
EGFR: 107 ML/MIN/1.73M2
EOSINOPHIL # BLD AUTO: 0.19 K/UL
EOSINOPHIL NFR BLD AUTO: 1.8 %
GLUCOSE SERPL-MCNC: 98 MG/DL
HCT VFR BLD CALC: 46.3 %
HGB BLD-MCNC: 14.9 G/DL
IGA SER QL IEP: 396 MG/DL
IGG SER QL IEP: 1151 MG/DL
IGM SER QL IEP: 231 MG/DL
IMM GRANULOCYTES NFR BLD AUTO: 0.3 %
KAPPA LC CSF-MCNC: 2.07 MG/DL
KAPPA LC SERPL-MCNC: 2.53 MG/DL
LYMPHOCYTES # BLD AUTO: 3.78 K/UL
LYMPHOCYTES NFR BLD AUTO: 35.3 %
MAN DIFF?: NORMAL
MCHC RBC-ENTMCNC: 28.1 PG
MCHC RBC-ENTMCNC: 32.2 GM/DL
MCV RBC AUTO: 87.4 FL
MONOCYTES # BLD AUTO: 0.64 K/UL
MONOCYTES NFR BLD AUTO: 6 %
NEUTROPHILS # BLD AUTO: 6.04 K/UL
NEUTROPHILS NFR BLD AUTO: 56.2 %
PLATELET # BLD AUTO: 310 K/UL
POTASSIUM SERPL-SCNC: 4.6 MMOL/L
PROT SERPL-MCNC: 7.9 G/DL
RBC # BLD: 5.3 M/UL
RBC # FLD: 12.9 %
SODIUM SERPL-SCNC: 140 MMOL/L
WBC # FLD AUTO: 10.72 K/UL

## 2024-01-10 NOTE — PHYSICAL EXAM
[Alert] : alert [Well Nourished] : well nourished [No Acute Distress] : no acute distress [Well Developed] : well developed [Sclera Not Icteric] : sclera not icteric [Normal TMs] : both tympanic membranes were normal [Boggy Nasal Turbinates] : boggy and/or pale nasal turbinates [No Neck Mass] : no neck mass was observed [Normal Rate and Effort] : normal respiratory rhythm and effort [No Crackles] : no crackles [Bilateral Audible Breath Sounds] : bilateral audible breath sounds [Normal Rate] : heart rate was normal  [Normal S1, S2] : normal S1 and S2 [No murmur] : no murmur [Regular Rhythm] : with a regular rhythm [Not Distended] : not distended [Normal Cervical Lymph Nodes] : cervical [Skin Intact] : skin intact  [No Rash] : no rash [No Cyanosis] : no cyanosis [Normal Mood] : mood was normal [Normal Affect] : affect was normal [Judgment and Insight Age Appropriate] : judgement and insight is age appropriate [Alert, Awake, Oriented as Age-Appropriate] : alert, awake, oriented as age appropriate [Conjunctival Erythema] : no conjunctival erythema [Pharyngeal erythema] : no pharyngeal erythema [Posterior Pharyngeal Cobblestoning] : no posterior pharyngeal cobblestoning [Clear Rhinorrhea] : no clear rhinorrhea was seen [Exudate] : no exudate [Wheezing] : no wheezing was heard [Patches] : no patches [Urticaria] : no urticaria

## 2024-01-10 NOTE — HISTORY OF PRESENT ILLNESS
[de-identified] :  33yF two+ years s/p septoplasty and turbinate reductions, persistent congestion and resent diagnosis of nasal  polyps currently preents for an initial evaluation. Referred by Dr. Zamarripa for evaluation of environment allergies.     CHRONIC RHINOSINUSITIS WITH SMALL NASAL POLYPS:  Has had chronic nasal congestion and decreased sense of smell for the past few years. Two years ago, had septoplasty with improvement of nasal congestion, anosmia persisted.  Last year, she started developing intermittent nasal congestion,  she started mometasone and azelastine prn with improvement.   Admits to poor sense of smell. Sense of taste is intact.  Rhinoscopy from Sept 2023- As per ENT notes, small polyp on the left, mild inferior turbinate hypertrophy on left, midline septum with pinpoint central perforation.   Patient had had one  ENT surgery- March 2021- Septoplasty and turbinate reduction.  Admits to 2-3 antibiotics/ year for sinusitis with improvement of nasal congestion.  Denies use of oral steroid within last year.  Denies recent use of oral or nasal antihistamine.  oN PRN Mometasone rinse and Azelastine.   - Nasal endoscopy by Dr Zamarripa 9/1/23: Middle meati clear on the right but small polyp on the left, mild inferior turbinate hypertrophy on left, midline septum with pinpoint central perforation, right and left sides widely patent, mild nsd to left now. The osteomeatal complexes are clear with no polyposis or purulence. The sphenoethmoidal recesses are clear with no polyposis or purulence. Nasopharynx is not obstructed. There is minimal mucopurulence present in both posterior nasal cavities and nasopharynx.   Asthma: Diagnosed when she was 13 years old. Currently, on albuterol prn. Triggered by URI.  Last week, she was placed on amoxicillin for URI with albuterol two puff qd.  Currently, denies wheezing, shortness of breath or exertional symptoms.  -Prior to the above infection, last use of albuterol was months ago.   No NSAID allergy. Last use of Motrin was three days ago without any issues.   Deveops ocular pruritus and sneezing when she is around dogs or cats. .      History of respiratory symptoms after alcohol ingestion: unsure, does not drink.  History of hives:no  History of GI bleeding or ulcers:no   No pneumonia, neuropathy or history of skin abscess- Denies.   Sept 2023 SINUS CT: FRONTAL SINUSES: Clear. Frontal sinuses are widely patent. ANTERIOR ETHMOIDS: Clear. POSTERIOR ETHMOIDS: Clear. SPHENOID SINUSES: Clear. Sphenoethmoidal recesses are widely patent. MAXILLARY SINUSES: Mucosal thickening along the base of the right maxillary sinus. Accessory ostium involving the right maxillary sinus. OSTIOMEATAL UNITS: Clear. NASAL CAVITY: Rightward deviation of nasal septum with nasal spur present along its anterior aspect. Mucosal thickening present along the bilateral middle and inferior turbinates, left greater than right. Evidence of prior septoplasty is difficult to appreciate. MASTOID AIR CELLS: Clear.

## 2024-01-10 NOTE — REVIEW OF SYSTEMS
[Nl] : Cardiovascular [Fatigue] : no fatigue [Fever] : no fever [Eye Discharge] : no eye discharge [Eye Redness] : no redness [Puffy Eyelids] : no puffy ~T eyelids [Bloodshot Eyes] : no bloodshot ~T eyes [Nocturnal Awakening] : no nocturnal awakening with shortness of breath [Cough] : no cough [Wheezing Worsens With Exercise] : wheezing does not worsen with exercise [Wheezing] : no wheezing [Vomiting] : no vomiting [Diarrhea] : no diarrhea [Limping] : no limping [Joint Pains] : no arthralgias [Urticaria] : no urticaria [Atopic Dermatitis] : no atopic dermatitis [Swelling] : no swelling [Easy Bruising] : no tendency for easy bruising [Swollen Glands] : no lymphadenopathy [Sleep Disturbances] : ~T no sleep disturbances [Hyperactive] : no hyperactive behavior [FreeTextEntry4] : see HPI  [de-identified] : see HPI

## 2024-01-10 NOTE — END OF VISIT
[FreeTextEntry3] : I, Dr. Nimo Todd, personally performed the evaluation and management (E/M) services for this new patient.  That E/M includes conducting the initial examination, assessing all conditions, and establishing the plan of care.  Today, my MEHDI, Taulia, was here to observe my evaluation and management services for this patient to be followed going forward.

## 2024-01-10 NOTE — SOCIAL HISTORY
[House] : [unfilled] lives in a house  [Radiator/Baseboard] : heating provided by radiator(s)/baseboard(s) [Bedroom] :  in bedroom [None] : none [Dust Mite Covers] : does not have dust mite covers

## 2024-01-10 NOTE — CONSULT LETTER
[Dear  ___] : Dear  [unfilled], [Consult Letter:] : I had the pleasure of evaluating your patient, [unfilled]. [Please see my note below.] : Please see my note below. [Consult Closing:] : Thank you very much for allowing me to participate in the care of this patient.  If you have any questions, please do not hesitate to contact me. [Sincerely,] : Sincerely, [FreeTextEntry3] : MD REID Grant, JOVI Adult and Pediatric Allergy, Asthma and Clinical Immunology Associate Professor of Medicine and Pediatrics at   [facility], Adult Allergy and Immunology   [facility]   Division of Allergy, Asthma and Immunology   [address] [place] [address], [place] [address]   Phone [phone]  Fax [phone]

## 2024-01-10 NOTE — IMPRESSION
[Spirometry] : Spirometry [Mild] : (mild) [Allergy Testing Mixed Feathers] : feathers [] : molds [Allergy Testing Weeds] : weeds [Allergy Testing Grasses] : grasses [Normal Spirometry] : spirometry normal [_____] : grasses ([unfilled]) [Allergy Testing Cockroach] : cockroach

## 2024-01-17 LAB
C TETANI IGG SER-ACNC: 2.75 IU/ML
DEPRECATED S PNEUM 1 IGG SER-MCNC: 3.6 MCG/ML
DEPRECATED S PNEUM12 AB SER-ACNC: 1.5 MCG/ML
DEPRECATED S PNEUM14 AB SER-ACNC: 2 MCG/ML
DEPRECATED S PNEUM17 IGG SER IA-MCNC: 0.7 MCG/ML
DEPRECATED S PNEUM18 IGG SER IA-MCNC: 0.3 MCG/ML
DEPRECATED S PNEUM19 IGG SER-MCNC: 3.7 MCG/ML
DEPRECATED S PNEUM19 IGG SER-MCNC: 8.5 MCG/ML
DEPRECATED S PNEUM2 IGG SER-MCNC: 1.2 MCG/ML
DEPRECATED S PNEUM20 IGG SER-MCNC: 7.8 MCG/ML
DEPRECATED S PNEUM22 IGG SER-MCNC: 1.7 MCG/ML
DEPRECATED S PNEUM23 AB SER-ACNC: 2.1 MCG/ML
DEPRECATED S PNEUM3 AB SER-ACNC: 0.5 MCG/ML
DEPRECATED S PNEUM34 IGG SER-MCNC: 1.1 MCG/ML
DEPRECATED S PNEUM4 AB SER-ACNC: 0.4 MCG/ML
DEPRECATED S PNEUM5 IGG SER-MCNC: 1.6 MCG/ML
DEPRECATED S PNEUM6 IGG SER-MCNC: 7 MCG/ML
DEPRECATED S PNEUM7 IGG SER-ACNC: 3.3 MCG/ML
DEPRECATED S PNEUM8 AB SER-ACNC: 4.2 MCG/ML
DEPRECATED S PNEUM9 AB SER-ACNC: 0.8 MCG/ML
DEPRECATED S PNEUM9 IGG SER-MCNC: 0.4 MCG/ML
IMMUNOLOGIST REVIEW: NORMAL
STREPTOCOCCUS PNEUMONIAE SEROTYPE 11A: 1.1 MCG/ML
STREPTOCOCCUS PNEUMONIAE SEROTYPE 15B: 4.1 MCG/ML
STREPTOCOCCUS PNEUMONIAE SEROTYPE 33F: 4 MCG/ML

## 2024-01-23 LAB
C LUNATA IGE QN: <0.1 KUA/L
COMPLEMENT, ALTERNATE PATHWAY (AH50): 102
CREATININE RANDOM URINE: 229 MG/DL
DEPRECATED A PULLULANS IGE RAST QL: 0
DEPRECATED C LUNATA IGE RAST QL: 0
DEPRECATED F MONILIFORME IGE RAST QL: 0
DEPRECATED M RACEMOSUS IGE RAST QL: 0
DEPRECATED R NIGRICANS IGE RAST QL: 0
F MONILIFORME IGE QN: <0.1 KUA/L
LEUKOTRIENE E4, URINE: 66 PG/MG CR
M PROTEIN SPEC IFE-MCNC: NORMAL
M RACEMOSUS IGE QN: <0.1 KUA/L
MANNAN BINDING LECTIN (MBL): 729 NG/ML
MOLD (AUREOBASIDIUM M12) CONC: <0.1 KUA/L
R NIGRICANS IGE QN: <0.1 KUA/L
TOTAL IGE SMQN RAST: 96 KU/L
TRYPTASE: 7.2 UG/L

## 2024-02-01 ENCOUNTER — NON-APPOINTMENT (OUTPATIENT)
Age: 34
End: 2024-02-01

## 2024-02-02 ENCOUNTER — APPOINTMENT (OUTPATIENT)
Dept: OTOLARYNGOLOGY | Facility: CLINIC | Age: 34
End: 2024-02-02
Payer: COMMERCIAL

## 2024-02-02 VITALS — BODY MASS INDEX: 41.78 KG/M2 | WEIGHT: 260 LBS | HEIGHT: 66 IN

## 2024-02-02 PROCEDURE — 99214 OFFICE O/P EST MOD 30 MIN: CPT | Mod: 25

## 2024-02-02 PROCEDURE — 31231 NASAL ENDOSCOPY DX: CPT

## 2024-02-02 RX ORDER — AZELASTINE HYDROCHLORIDE 137 UG/1
0.1 SPRAY, METERED NASAL TWICE DAILY
Qty: 1 | Refills: 2 | Status: ACTIVE | COMMUNITY
Start: 2023-02-15 | End: 1900-01-01

## 2024-02-02 NOTE — HISTORY OF PRESENT ILLNESS
[de-identified] : 34 year old female presents for follow up for chronic nasal congestion. History of septoplasty about 2 years ago. Congestion stable overall.  Saw AI, + dog cat dust mites, trees Was using mometasone rinses and azelastine PRN - but now using xyzal as per Dr. Todd with little changes to symptoms.  Obtained new CT sinuses back in Sept 2023.  Continues to have poor sense of smell.  Sinus headaches stable. Recent URI end of December with abx course completed.  Denies sinus pain, post nasal drip, dyspnea, recent fevers Reports currently on steroid taper for recent asthma exacerbation.  Since december, has had excessive coughing, given steroids, starting to help  CT Sinuses No Cont 09/28/23:  IMPRESSION: Chronic sinus inflammatory changes within the right maxillary sinus, without obstructive pattern suggested. Nasal septum deviation with rightward convexity, stable compared to November 2020.

## 2024-02-02 NOTE — CONSULT LETTER
[Dear  ___] : Dear  [unfilled], [Sincerely,] : Sincerely, [FreeTextEntry2] : Carin Broderick MD ([place], [place]) [FreeTextEntry3] : Thom Zamarripa MD, PhD\par  Chief, Division of Laryngology\par  Department of Otolaryngology\par  [facility]\par  Pediatric Otolaryngology, [facility]\par   of Otolaryngology\par  [employer] [facility]  [DrKacie  ___] : Dr. POSADA

## 2024-02-09 LAB — 2,3-DINOR-11B-PROSTAGLANDIN F2A, RANDOM URINE: NORMAL

## 2024-02-12 ENCOUNTER — APPOINTMENT (OUTPATIENT)
Dept: PEDIATRIC ALLERGY IMMUNOLOGY | Facility: CLINIC | Age: 34
End: 2024-02-12

## 2024-02-26 ENCOUNTER — APPOINTMENT (OUTPATIENT)
Dept: PEDIATRIC ALLERGY IMMUNOLOGY | Facility: CLINIC | Age: 34
End: 2024-02-26
Payer: COMMERCIAL

## 2024-02-26 ENCOUNTER — NON-APPOINTMENT (OUTPATIENT)
Age: 34
End: 2024-02-26

## 2024-02-26 VITALS
HEART RATE: 76 BPM | SYSTOLIC BLOOD PRESSURE: 115 MMHG | WEIGHT: 260 LBS | OXYGEN SATURATION: 95 % | BODY MASS INDEX: 41.78 KG/M2 | DIASTOLIC BLOOD PRESSURE: 78 MMHG | HEIGHT: 66 IN

## 2024-02-26 DIAGNOSIS — Z13.0 ENCOUNTER FOR SCREENING FOR OTHER SUSPECTED ENDOCRINE DISORDER: ICD-10-CM

## 2024-02-26 DIAGNOSIS — J33.9 NASAL POLYP, UNSPECIFIED: ICD-10-CM

## 2024-02-26 DIAGNOSIS — Z13.228 ENCOUNTER FOR SCREENING FOR OTHER SUSPECTED ENDOCRINE DISORDER: ICD-10-CM

## 2024-02-26 DIAGNOSIS — Z13.29 ENCOUNTER FOR SCREENING FOR OTHER SUSPECTED ENDOCRINE DISORDER: ICD-10-CM

## 2024-02-26 DIAGNOSIS — J30.1 ALLERGIC RHINITIS DUE TO POLLEN: ICD-10-CM

## 2024-02-26 DIAGNOSIS — J45.21 MILD INTERMITTENT ASTHMA WITH (ACUTE) EXACERBATION: ICD-10-CM

## 2024-02-26 PROCEDURE — 99213 OFFICE O/P EST LOW 20 MIN: CPT | Mod: 25

## 2024-02-27 ENCOUNTER — NON-APPOINTMENT (OUTPATIENT)
Age: 34
End: 2024-02-27

## 2024-03-03 ENCOUNTER — NON-APPOINTMENT (OUTPATIENT)
Age: 34
End: 2024-03-03

## 2024-03-04 NOTE — HISTORY OF PRESENT ILLNESS
[de-identified] : 34yF,  two+ years s/p septoplasty and turbinate reductions, CRs with one small polyp, persistent congestion, here for follow up. . Referred by Dr. Zamarripa  = asthma, on albuterol prn  =AR: Dust mite, cat, dog and tree pollen.  =ASA challenge: not done.    -Eos - 190 IGE-96 -MBL- 729 - Nasal polyp- one small polyp.    Interval history: Last visit xhance was added, no clinical improvement.  Still has nasal congestion  and decreased sense of smell.  Past history of septoplasty 2021. on Azelastine.  As per the ENT notes from 2/4/2024- small polyp on the left improved,  Last visit, we offered immunotherapy. The Next month the patient is moving [place], thus deferred Immunotherapy.   Interval history.  CHRONIC RHINOSINUSITIS WITH SMALL NASAL POLYPS: Has had chronic nasal congestion and decreased sense of smell for the past few years. Two years ago, had septoplasty with improvement of nasal congestion, anosmia persisted. Last year, she started developing intermittent nasal congestion, she started mometasone and azelastine prn with improvement.  Admits to poor sense of smell. Sense of taste is intact. Rhinoscopy from Sept 2023- As per ENT notes, small polyp on the left, mild inferior turbinate hypertrophy on left, midline septum with pinpoint central perforation.  Patient had had one ENT surgery- March 2021- Septoplasty and turbinate reduction. Admits to 2-3 antibiotics/ year for sinusitis with improvement of nasal congestion. Denies use of oral steroid within last year. Denies recent use of oral or nasal antihistamine. oN PRN Mometasone rinse and Azelastine.  - Nasal endoscopy by Dr Zamarripa 9/1/23: Middle meati clear on the right but small polyp on the left, mild inferior turbinate hypertrophy on left, midline septum with pinpoint central perforation, right and left sides widely patent, mild nsd to left now. The osteomeatal complexes are clear with no polyposis or purulence. The sphenoethmoidal recesses are clear with no polyposis or purulence. Nasopharynx is not obstructed. There is minimal mucopurulence present in both posterior nasal cavities and nasopharynx.  Asthma: Diagnosed when she was 13 years old. Currently, on albuterol prn. Triggered by URI. Last week, she was placed on amoxicillin for URI with albuterol two puff qd. Currently, denies wheezing, shortness of breath or exertional symptoms. -Prior to the above infection, last use of albuterol was months ago.  No NSAID allergy. Last use of Motrin was three days ago without any issues.  Deveops ocular pruritus and sneezing when she is around dogs or cats..    History of respiratory symptoms after alcohol ingestion: unsure, does not drink. History of hives:no History of GI bleeding or ulcers:no  No pneumonia, neuropathy or history of skin abscess- Denies.  Sept 2023 SINUS CT: FRONTAL SINUSES: Clear. Frontal sinuses are widely patent. ANTERIOR ETHMOIDS: Clear. POSTERIOR ETHMOIDS: Clear. SPHENOID SINUSES: Clear. Sphenoethmoidal recesses are widely patent. MAXILLARY SINUSES: Mucosal thickening along the base of the right maxillary sinus. Accessory ostium involving the right maxillary sinus. OSTIOMEATAL UNITS: Clear. NASAL CAVITY: Rightward deviation of nasal septum with nasal spur present along its anterior aspect. Mucosal thickening present along the bilateral middle and inferior turbinates, left greater than right. Evidence of prior septoplasty is difficult to appreciate. MASTOID AIR CELLS: Clear.

## 2024-03-04 NOTE — REVIEW OF SYSTEMS
[Nl] : Genitourinary [Fever] : no fever [Fatigue] : no fatigue [Puffy Eyelids] : no puffy ~T eyelids [Bloodshot Eyes] : no bloodshot ~T eyes [Nosebleeds] : no epistaxis [Post Nasal Drip] : no post nasal drip [Cyanosis] : no cyanosis [Sneezing] : no sneezing [Edema] : no edema [Exercise Intolerance] : no persistence of exercise intolerance [Cough] : no cough [Wheezing Worsens With Exercise] : wheezing does not worsen with exercise [Wheezing Worse During Cold Weather] : wheezing not ~L worse during cold weather [Vomiting] : no vomiting [Diarrhea] : no diarrhea

## 2024-03-04 NOTE — REASON FOR VISIT
[Routine Follow-Up] : a routine follow-up visit for [Hay Fever] : hay fever [FreeTextEntry2] : CRSwNP

## 2024-03-04 NOTE — PHYSICAL EXAM
[Alert] : alert [Well Nourished] : well nourished [No Acute Distress] : no acute distress [Well Developed] : well developed [Sclera Not Icteric] : sclera not icteric [Normal TMs] : both tympanic membranes were normal [Boggy Nasal Turbinates] : boggy and/or pale nasal turbinates [Normal Rate and Effort] : normal respiratory rhythm and effort [No Crackles] : no crackles [Bilateral Audible Breath Sounds] : bilateral audible breath sounds [Normal Rate] : heart rate was normal  [Normal S1, S2] : normal S1 and S2 [No murmur] : no murmur [Regular Rhythm] : with a regular rhythm [Normal Cervical Lymph Nodes] : cervical [Skin Intact] : skin intact  [No Rash] : no rash [Normal Mood] : mood was normal [Normal Affect] : affect was normal [Judgment and Insight Age Appropriate] : judgement and insight is age appropriate [Alert, Awake, Oriented as Age-Appropriate] : alert, awake, oriented as age appropriate [Conjunctival Erythema] : no conjunctival erythema [Pharyngeal erythema] : no pharyngeal erythema [Posterior Pharyngeal Cobblestoning] : no posterior pharyngeal cobblestoning [Clear Rhinorrhea] : no clear rhinorrhea was seen [Wheezing] : no wheezing was heard [Patches] : no patches [Urticaria] : no urticaria

## 2024-03-19 ENCOUNTER — APPOINTMENT (OUTPATIENT)
Dept: PEDIATRIC ALLERGY IMMUNOLOGY | Facility: CLINIC | Age: 34
End: 2024-03-19

## 2024-07-24 ENCOUNTER — APPOINTMENT (OUTPATIENT)
Dept: OTOLARYNGOLOGY | Facility: CLINIC | Age: 34
End: 2024-07-24

## 2024-09-03 ENCOUNTER — RX RENEWAL (OUTPATIENT)
Age: 34
End: 2024-09-03

## 2024-09-03 RX ORDER — AZELASTINE HYDROCHLORIDE 137 UG/1
137 SPRAY, METERED NASAL
Qty: 1 | Refills: 2 | Status: ACTIVE | COMMUNITY
Start: 2024-09-03 | End: 1900-01-01

## 2024-10-28 ENCOUNTER — RX RENEWAL (OUTPATIENT)
Age: 34
End: 2024-10-28

## 2025-03-17 ENCOUNTER — RX RENEWAL (OUTPATIENT)
Age: 35
End: 2025-03-17